# Patient Record
Sex: FEMALE | Race: WHITE | URBAN - METROPOLITAN AREA
[De-identification: names, ages, dates, MRNs, and addresses within clinical notes are randomized per-mention and may not be internally consistent; named-entity substitution may affect disease eponyms.]

---

## 2017-11-02 ENCOUNTER — APPOINTMENT (OUTPATIENT)
Dept: ULTRASOUND IMAGING | Facility: CLINIC | Age: 27
End: 2017-11-02
Attending: EMERGENCY MEDICINE

## 2017-11-02 ENCOUNTER — HOSPITAL ENCOUNTER (EMERGENCY)
Facility: CLINIC | Age: 27
Discharge: HOME | End: 2017-11-02
Attending: EMERGENCY MEDICINE | Admitting: EMERGENCY MEDICINE

## 2017-11-02 VITALS
TEMPERATURE: 98 F | OXYGEN SATURATION: 100 % | WEIGHT: 134.48 LBS | HEART RATE: 80 BPM | SYSTOLIC BLOOD PRESSURE: 117 MMHG | RESPIRATION RATE: 20 BRPM | DIASTOLIC BLOOD PRESSURE: 79 MMHG

## 2017-11-02 DIAGNOSIS — M54.6 ACUTE RIGHT-SIDED THORACIC BACK PAIN: ICD-10-CM

## 2017-11-02 DIAGNOSIS — R10.9 FLANK PAIN: ICD-10-CM

## 2017-11-02 LAB
ALBUMIN SERPL-MCNC: 3.7 G/DL (ref 3.4–5)
ALBUMIN UR-MCNC: NEGATIVE MG/DL
ALP SERPL-CCNC: 61 U/L (ref 40–150)
ALT SERPL W P-5'-P-CCNC: 14 U/L (ref 0–50)
ANION GAP SERPL CALCULATED.3IONS-SCNC: 6 MMOL/L (ref 3–14)
APPEARANCE UR: ABNORMAL
AST SERPL W P-5'-P-CCNC: 7 U/L (ref 0–45)
BACTERIA #/AREA URNS HPF: ABNORMAL /HPF
BASOPHILS # BLD AUTO: 0.1 10E9/L (ref 0–0.2)
BASOPHILS NFR BLD AUTO: 0.8 %
BILIRUB SERPL-MCNC: 0.3 MG/DL (ref 0.2–1.3)
BILIRUB UR QL STRIP: NEGATIVE
BUN SERPL-MCNC: 9 MG/DL (ref 7–30)
CALCIUM SERPL-MCNC: 9 MG/DL (ref 8.5–10.1)
CHLORIDE SERPL-SCNC: 104 MMOL/L (ref 94–109)
CO2 SERPL-SCNC: 28 MMOL/L (ref 20–32)
COLOR UR AUTO: YELLOW
CREAT SERPL-MCNC: 0.62 MG/DL (ref 0.52–1.04)
DIFFERENTIAL METHOD BLD: NORMAL
EOSINOPHIL # BLD AUTO: 0.5 10E9/L (ref 0–0.7)
EOSINOPHIL NFR BLD AUTO: 6.1 %
ERYTHROCYTE [DISTWIDTH] IN BLOOD BY AUTOMATED COUNT: 12.6 % (ref 10–15)
GFR SERPL CREATININE-BSD FRML MDRD: >90 ML/MIN/1.7M2
GLUCOSE SERPL-MCNC: 91 MG/DL (ref 70–99)
GLUCOSE UR STRIP-MCNC: NEGATIVE MG/DL
HCG UR QL: NEGATIVE
HCT VFR BLD AUTO: 38.8 % (ref 35–47)
HGB BLD-MCNC: 12.6 G/DL (ref 11.7–15.7)
HGB UR QL STRIP: NEGATIVE
IMM GRANULOCYTES # BLD: 0 10E9/L (ref 0–0.4)
IMM GRANULOCYTES NFR BLD: 0.4 %
KETONES UR STRIP-MCNC: NEGATIVE MG/DL
LEUKOCYTE ESTERASE UR QL STRIP: NEGATIVE
LIPASE SERPL-CCNC: 109 U/L (ref 73–393)
LYMPHOCYTES # BLD AUTO: 2.7 10E9/L (ref 0.8–5.3)
LYMPHOCYTES NFR BLD AUTO: 33.5 %
MCH RBC QN AUTO: 30.2 PG (ref 26.5–33)
MCHC RBC AUTO-ENTMCNC: 32.5 G/DL (ref 31.5–36.5)
MCV RBC AUTO: 93 FL (ref 78–100)
MONOCYTES # BLD AUTO: 0.5 10E9/L (ref 0–1.3)
MONOCYTES NFR BLD AUTO: 6.8 %
MUCOUS THREADS #/AREA URNS LPF: PRESENT /LPF
NEUTROPHILS # BLD AUTO: 4.2 10E9/L (ref 1.6–8.3)
NEUTROPHILS NFR BLD AUTO: 52.4 %
NITRATE UR QL: NEGATIVE
NRBC # BLD AUTO: 0 10*3/UL
NRBC BLD AUTO-RTO: 0 /100
PH UR STRIP: 5 PH (ref 5–7)
PLATELET # BLD AUTO: 292 10E9/L (ref 150–450)
POTASSIUM SERPL-SCNC: 3.8 MMOL/L (ref 3.4–5.3)
PROT SERPL-MCNC: 7.4 G/DL (ref 6.8–8.8)
RBC # BLD AUTO: 4.17 10E12/L (ref 3.8–5.2)
RBC #/AREA URNS AUTO: 2 /HPF (ref 0–2)
SODIUM SERPL-SCNC: 138 MMOL/L (ref 133–144)
SOURCE: ABNORMAL
SP GR UR STRIP: 1.02 (ref 1–1.03)
SQUAMOUS #/AREA URNS AUTO: 4 /HPF (ref 0–1)
UROBILINOGEN UR STRIP-MCNC: 0 MG/DL (ref 0–2)
WBC # BLD AUTO: 8 10E9/L (ref 4–11)
WBC #/AREA URNS AUTO: 3 /HPF (ref 0–2)

## 2017-11-02 PROCEDURE — 25000128 H RX IP 250 OP 636: Performed by: EMERGENCY MEDICINE

## 2017-11-02 PROCEDURE — 83690 ASSAY OF LIPASE: CPT | Performed by: EMERGENCY MEDICINE

## 2017-11-02 PROCEDURE — 80053 COMPREHEN METABOLIC PANEL: CPT | Performed by: EMERGENCY MEDICINE

## 2017-11-02 PROCEDURE — 96374 THER/PROPH/DIAG INJ IV PUSH: CPT

## 2017-11-02 PROCEDURE — 81001 URINALYSIS AUTO W/SCOPE: CPT | Performed by: EMERGENCY MEDICINE

## 2017-11-02 PROCEDURE — 99285 EMERGENCY DEPT VISIT HI MDM: CPT | Mod: 25

## 2017-11-02 PROCEDURE — 76705 ECHO EXAM OF ABDOMEN: CPT

## 2017-11-02 PROCEDURE — 96375 TX/PRO/DX INJ NEW DRUG ADDON: CPT

## 2017-11-02 PROCEDURE — 96361 HYDRATE IV INFUSION ADD-ON: CPT

## 2017-11-02 PROCEDURE — 81025 URINE PREGNANCY TEST: CPT | Performed by: EMERGENCY MEDICINE

## 2017-11-02 PROCEDURE — 85025 COMPLETE CBC W/AUTO DIFF WBC: CPT | Performed by: EMERGENCY MEDICINE

## 2017-11-02 RX ORDER — KETOROLAC TROMETHAMINE 30 MG/ML
30 INJECTION, SOLUTION INTRAMUSCULAR; INTRAVENOUS ONCE
Status: COMPLETED | OUTPATIENT
Start: 2017-11-02 | End: 2017-11-02

## 2017-11-02 RX ORDER — DIAZEPAM 10 MG/2ML
5 INJECTION, SOLUTION INTRAMUSCULAR; INTRAVENOUS ONCE
Status: COMPLETED | OUTPATIENT
Start: 2017-11-02 | End: 2017-11-02

## 2017-11-02 RX ORDER — SODIUM CHLORIDE 9 MG/ML
1000 INJECTION, SOLUTION INTRAVENOUS CONTINUOUS
Status: DISCONTINUED | OUTPATIENT
Start: 2017-11-02 | End: 2017-11-02 | Stop reason: HOSPADM

## 2017-11-02 RX ORDER — DIAZEPAM 5 MG
5 TABLET ORAL EVERY 6 HOURS PRN
Qty: 12 TABLET | Refills: 0 | Status: SHIPPED | OUTPATIENT
Start: 2017-11-02 | End: 2018-06-16

## 2017-11-02 RX ADMIN — SODIUM CHLORIDE 1000 ML: 9 INJECTION, SOLUTION INTRAVENOUS at 17:33

## 2017-11-02 RX ADMIN — KETOROLAC TROMETHAMINE 30 MG: 30 INJECTION, SOLUTION INTRAMUSCULAR at 17:33

## 2017-11-02 RX ADMIN — DIAZEPAM 5 MG: 5 INJECTION, SOLUTION INTRAMUSCULAR; INTRAVENOUS at 18:17

## 2017-11-02 ASSESSMENT — ENCOUNTER SYMPTOMS
SHORTNESS OF BREATH: 0
APPETITE CHANGE: 1
FEVER: 0
DYSURIA: 0
BACK PAIN: 1
FLANK PAIN: 1
FREQUENCY: 1

## 2017-11-02 NOTE — ED PROVIDER NOTES
History     Chief Complaint:  Abdominal Pain    HPI   Alla Mcclain is a 26 year old female who presents to the emergency department for evaluation of back and abdominal pain. The patient says that she has been having a pain deep in her body that she feels in her back and in between her ribs for the past 3 days. She says in the past when she had it, it just goes away on its own after a couple of days. She describes this pain as a dull aching pain or like a pressure inside with a random stabbing pain. She says that movement doesn't affect it too much and she hasn't eaten today due to her pain. She also notes an increase in frequency and notes that she feels pain with deep breaths. She denies any fevers, dysuria, chest pain or shortness of breath. And she denies any recent injuries that could be the cause.     Allergies:  No known Drug Allergies      Medications:    Sertraline   Hydroxyzine  Ibuprofen     Past Medical History:    Anxiety  Depression    Past Surgical History:    C section    Family History:    History reviewed. No pertinent family history.     Social History:  History of gallbladder removal in immediate family     Review of Systems   Constitutional: Positive for appetite change. Negative for fever.   Respiratory: Negative for shortness of breath.    Cardiovascular: Negative for chest pain.   Genitourinary: Positive for flank pain and frequency. Negative for dysuria.   Musculoskeletal: Positive for back pain.   All other systems reviewed and are negative.        Physical Exam   First Vitals:  BP: 106/83  Pulse: 79  Temp: 98  F (36.7  C)  Resp: 16  Weight: 61 kg (134 lb 7.7 oz)  SpO2: 99 %      Physical Exam  Constitutional: Alert, attentive, GCS 15, well appearing young woman   HENT:    Nose: Nose normal.    Mouth/Throat: Oropharynx is clear, mucous membranes are moist   Eyes: Normal conjunctiva. Pupils are equal, round, and reactive to light.   CV: regular rate and rhythm; no murmurs, rubs or  gallups  Chest: Effort normal and breath sounds normal.   GI:  There is tenderness to palpation in RUQ and epigastrium, no rebound or guarding. No distension. Normal bowel sounds  Back: she has tenderness to palpation in her right thoracic paraspinal muscle and flank, no midline C/T/L spine tenderness  MSK: Normal range of motion.   Neurological: Alert, attentive, oriented x4, strength intact  Skin: Skin is warm and dry.     Emergency Department Course     Imaging:  Radiology findings were communicated with the patient who voiced understanding of the findings.      Abdomen US, limited (RUQ only)  Preliminary Result  IMPRESSION: No gallstones or biliary dilatation. No evidence of right  Hydronephrosis.  Reading is prelaminarly at time of note    Laboratory:  Laboratory findings were communicated with the patient who voiced understanding of the findings.    Lipase: 109  Ua with microscopic: few bacteria (A), squamous epithelial 4 (H), WBC/HPF 3 (H), mucous present (A)  CBC: WBC 8, HGB 12.6,   CMP: o/w WNL (Creatinine 0.62)  HCG: Negative    Interventions:  1733 NS 1L IV Bolus   1733 Toradol 30 mg IV  1817 Valium 5 mg IV    Emergency Department Course:  Nursing notes and vitals reviewed.  I performed an exam of the patient as documented above.   I discussed the treatment plan with the patient. They expressed understanding of this plan and consented to discharge. They will be discharged home with instructions for care and follow up. In addition, the patient will return to the emergency department if their symptoms persist, worsen, if new symptoms arise or if there is any concern.  All questions were answered.     I personally reviewed the imaging and lab results with the patient and answered all related questions prior to discharge.  Impression & Plan      Medical Decision Makin year old female presenting with right thoracic back, flank, and upper abdominal pain.  Differential diagnosis includes renal  colic, cholelithiasis, cholecystitis, GERD, gastritis, thoracic back pain, muscle spasm, pulmonary embolism, pancreatitis.  She did have both thoracic paraspinal muscle tenderness and upper abdominal pain on exam.  Her work-up in the ED has been negative including screening abdominal labs and UA.  There is no hematuria to suggest renal colic and her pain does not appear severe enough for renal colic.  As she had been told she may have gallstones in the past with previous episodes of pain and because she doesn't have established primary care, I did obtain a RUQ ultrasound which shows a normal gallbladder.  Her pain appeared to be most improved with valium.  Repeat exam shows the majority of the pain is in her right thoracic back.  I discussed that muscle sprain is most likely diagnosis, but that she would need to see primary care doctor for follow-up.  Patient was amenable to this plan and is comfortable for discharge home.  All questions were answered.      Diagnosis:    ICD-10-CM    1. Flank pain R10.9 CBC with platelets differential     Comprehensive metabolic panel     UA with Microscopic     HCG qualitative urine     Lipase   2. Acute right-sided thoracic back pain M54.6      Disposition:   Discharged     Discharge Medications:  New Prescriptions    DIAZEPAM (VALIUM) 5 MG TABLET    Take 1 tablet (5 mg) by mouth every 6 hours as needed for muscle spasms (MUSCLE SPASM)       Scribe Disclosure:  I, Ata Hinojosa, am serving as a scribe at 5:56 PM on 11/2/2017 to document services personally performed by Carlee Topete MD, based on my observations and the provider's statements to me.     [facility] EMERGENCY DEPARTMENT       Carlee Topete MD  11/03/17 031

## 2017-11-02 NOTE — ED AVS SNAPSHOT
[facility] Emergency Department    [address] [facility] [address]    Phone:  [phone]    Fax:  [phone]                                       Alla Mcclain   MRN: [id number]    Department:  [facility] Emergency Department   Date of Visit:  11/2/2017           Patient Information     Date Of Birth          1990        Your diagnoses for this visit were:     Flank pain     Acute right-sided thoracic back pain        You were seen by Carlee Topete MD.      Follow-up Information     Follow up with [facility]. Schedule an appointment as soon as possible for a visit in 1 week.    Specialty:  Family Medicine    Why:  for follow-up    Contact information:    [address] [facility] [address] [phone]        Go to [facility] Emergency Department.    Specialty:  EMERGENCY MEDICINE    Why:  If symptoms worsen including fevers, vomiting, severe pain    Contact information:    [address] [facility] [address] [phone]        Discharge Instructions       Discharge Instructions  Back Pain  You were seen today for back pain. Back pain can have many causes, but most will get better without surgery or other specific treatment. Sometimes there is a herniated ( slipped ) disc. We do not usually do MRI scans to look for these right away, since most herniated discs will get better on their own with time.  Today, we did not find any evidence that your back pain was caused by a serious condition. However, sometimes symptoms develop over time and cannot be found during an emergency visit, so it is very important that you follow up with your primary provider.  Generally, every Emergency Department visit should have a follow-up clinic visit with either a primary or a specialty clinic/provider. Please follow-up as instructed by your emergency provider today.    Return to the Emergency  Department if:    You develop a fever with your back pain.     You have weakness or change in sensation in one or both legs.    You lose control of your bowels or bladder, or cannot empty your bladder (cannot pee).    Your pain gets much worse.     Follow-up with your provider:    Unless your pain has completely gone away, please make an appointment with your provider within one week. Most of the routine care for back pain is available in a clinic and not the Emergency Department. You may need further management of your back pain, such as more pain medication, imaging such as an X-ray or MRI, or physical therapy.    What can I do to help myself?    Remain Active -- People are often afraid that they will hurt their back further or delay recovery by remaining active, but this is one of the best things you can do for your back. In fact, staying in bed for a long time to rest is not recommended. Studies have shown that people with low back pain recover faster when they remain active. Movement helps to bring blood flow to the muscles and relieve muscle spasms as well as preventing loss of muscle strength.    Heat -- Using a heating pad can help with low back pain during the first few weeks. Do not sleep with a heating pad, as you can be burned.     Pain medications - You may take a pain medication such as Tylenol  (acetaminophen), Advil , Motrin  (ibuprofen) or Aleve  (naproxen).  If you were given a prescription for medicine here today, be sure to read all of the information (including the package insert) that comes with your prescription.  This will include important information about the medicine, its side effects, and any warnings that you need to know about.  The pharmacist who fills the prescription can provide more information and answer questions you may have about the medicine.  If you have questions or concerns that the pharmacist cannot address, please call or return to the Emergency Department.   Remember  that you can always come back to the Emergency Department if you are not able to see your regular provider in the amount of time listed above, if you get any new symptoms, or if there is anything that worries you.    Flank Pain, Uncertain Cause  The flank is the area between your upper abdomen and your back. Pain there is often caused by a problem with your kidneys. It might be a kidney infection or a kidney stone. Other causes of flank pain include spinal arthritis, a pinched nerve from a back injury, or a back muscle strain or spasm.  The cause of your flank pain is not certain. You may need other tests.  Home care  Follow these tips when caring for yourself at home:    You may use acetaminophen or ibuprofen to control pain, unless your health care provider prescribed another medicine. If you have chronic liver or kidney disease, talk with your provider before taking these medicines. Also talk with your provider first if you ve ever had a stomach ulcer or GI bleeding.    If the pain is coming from your muscles, you may get relief with ice or heat. During the first 2 days after the injury, put an ice pack on the painful area for 20 minutes every 2 to 4 hours. This will reduce swelling and pain. A hot shower, hot bath, or heating pad works well for a muscle spasm. You can start with ice, then switch to heat after 2 days. You might find that alternating ice and heat works well. Use the method that feels the best to you.  Follow-up care  Follow up with your healthcare provider if your symptoms don t get better over the next few days.  When to seek medical advice  Call your healthcare provider right away if any of these happen:    Repeated vomiting    Fever of 100.4 F (38 C) or higher, or as directed by your health care provider    Flank pain that gets worse    Pain that spreads to the front of your belly (abdomen)    Dizziness, weakness, or fainting    Blood in your urine    Burning feeling when you urinate or the  need to urinate often    Pain in one of your legs that gets worse    Numbness or weakness in a leg  Date Last Reviewed: 10/1/2016    7587-5640 [employer]. [address] [place] [address], [place] [address]. All rights reserved. This information is not intended as a substitute for professional medical care. Always follow your healthcare professional's instructions.          24 Hour Appointment Hotline       To make an appointment at any [facility], call [phone] ([phone]). If you don't have a family doctor or clinic, we will help you find one. [facility] clinics are conveniently located to serve the needs of you and your family.             Review of your medicines      START taking        Dose / Directions Last dose taken    diazepam 5 MG tablet   Commonly known as:  VALIUM   Dose:  5 mg   Quantity:  12 tablet        Take 1 tablet (5 mg) by mouth every 6 hours as needed for muscle spasms (MUSCLE SPASM)   Refills:  0          Our records show that you are taking the medicines listed below. If these are incorrect, please call your family doctor or clinic.        Dose / Directions Last dose taken    HYDROXYZINE HCL PO        Refills:  0        IBUPROFEN PO        Refills:  0        ZOLOFT PO        Take by mouth daily   Refills:  0                Prescriptions were sent or printed at these locations (1 Prescription)                   Other Prescriptions                Printed at Department/Unit printer (1 of 1)         diazepam (VALIUM) 5 MG tablet                Procedures and tests performed during your visit     Abdomen US, limited (RUQ only)    CBC with platelets differential    Comprehensive metabolic panel    HCG qualitative urine    Lipase    UA with Microscopic      Orders Needing Specimen Collection     None      Pending Results     Date and Time Order Name Status Description    11/2/2017 1803 Abdomen US, limited (RUQ only) Preliminary             Pending Culture Results     No orders  found from 10/31/2017 to 11/3/2017.            Pending Results Instructions     If you had any lab results that were not finalized at the time of your Discharge, you can call the ED Lab Result RN at [phone]. You will be contacted by this team for any positive Lab results or changes in treatment. The nurses are available 7 days a week from 10A to 6:30P.  You can leave a message 24 hours per day and they will return your call.        Test Results From Your Hospital Stay        11/2/2017  5:42 PM      Component Results     Component Value Ref Range & Units Status    WBC 8.0 4.0 - 11.0 10e9/L Final    RBC Count 4.17 3.8 - 5.2 10e12/L Final    Hemoglobin 12.6 11.7 - 15.7 g/dL Final    Hematocrit 38.8 35.0 - 47.0 % Final    MCV 93 78 - 100 fl Final    MCH 30.2 26.5 - 33.0 pg Final    MCHC 32.5 31.5 - 36.5 g/dL Final    RDW 12.6 10.0 - 15.0 % Final    Platelet Count 292 150 - 450 10e9/L Final    Diff Method Automated Method  Final    % Neutrophils 52.4 % Final    % Lymphocytes 33.5 % Final    % Monocytes 6.8 % Final    % Eosinophils 6.1 % Final    % Basophils 0.8 % Final    % Immature Granulocytes 0.4 % Final    Nucleated RBCs 0 0 /100 Final    Absolute Neutrophil 4.2 1.6 - 8.3 10e9/L Final    Absolute Lymphocytes 2.7 0.8 - 5.3 10e9/L Final    Absolute Monocytes 0.5 0.0 - 1.3 10e9/L Final    Absolute Eosinophils 0.5 0.0 - 0.7 10e9/L Final    Absolute Basophils 0.1 0.0 - 0.2 10e9/L Final    Abs Immature Granulocytes 0.0 0 - 0.4 10e9/L Final    Absolute Nucleated RBC 0.0  Final         11/2/2017  5:59 PM      Component Results     Component Value Ref Range & Units Status    Sodium 138 133 - 144 mmol/L Final    Potassium 3.8 3.4 - 5.3 mmol/L Final    Chloride 104 94 - 109 mmol/L Final    Carbon Dioxide 28 20 - 32 mmol/L Final    Anion Gap 6 3 - 14 mmol/L Final    Glucose 91 70 - 99 mg/dL Final    Urea Nitrogen 9 7 - 30 mg/dL Final    Creatinine 0.62 0.52 - 1.04 mg/dL Final    GFR Estimate >90 >60 mL/min/1.7m2 Final     Non  GFR Calc    GFR Estimate If Black >90 >60 mL/min/1.7m2 Final    African American GFR Calc    Calcium 9.0 8.5 - 10.1 mg/dL Final    Bilirubin Total 0.3 0.2 - 1.3 mg/dL Final    Albumin 3.7 3.4 - 5.0 g/dL Final    Protein Total 7.4 6.8 - 8.8 g/dL Final    Alkaline Phosphatase 61 40 - 150 U/L Final    ALT 14 0 - 50 U/L Final    AST 7 0 - 45 U/L Final         11/2/2017  5:49 PM      Component Results     Component Value Ref Range & Units Status    Color Urine Yellow  Final    Appearance Urine Slightly Cloudy  Final    Glucose Urine Negative NEG^Negative mg/dL Final    Bilirubin Urine Negative NEG^Negative Final    Ketones Urine Negative NEG^Negative mg/dL Final    Specific Gravity Urine 1.016 1.003 - 1.035 Final    Blood Urine Negative NEG^Negative Final    pH Urine 5.0 5.0 - 7.0 pH Final    Protein Albumin Urine Negative NEG^Negative mg/dL Final    Urobilinogen mg/dL 0.0 0.0 - 2.0 mg/dL Final    Nitrite Urine Negative NEG^Negative Final    Leukocyte Esterase Urine Negative NEG^Negative Final    Source Midstream Urine  Final    WBC Urine 3 (H) 0 - 2 /HPF Final    RBC Urine 2 0 - 2 /HPF Final    Bacteria Urine Few (A) NEG^Negative /HPF Final    Squamous Epithelial /HPF Urine 4 (H) 0 - 1 /HPF Final    Mucous Urine Present (A) NEG^Negative /LPF Final         11/2/2017  5:49 PM      Component Results     Component Value Ref Range & Units Status    HCG Qual Urine Negative NEG^Negative Final    This test is for screening purposes.  Results should be interpreted along with   the clinical picture.  Confirmation testing is available if warranted by   ordering [id number], HCG Quantitative Pregnancy.           11/2/2017  5:59 PM      Component Results     Component Value Ref Range & Units Status    Lipase 109 73 - 393 U/L Final         11/2/2017  7:03 PM      Narrative     US ABDOMEN LIMITED  11/2/2017 6:46 PM     HISTORY: RUQ pain/right flank pain, multiple episodes.    COMPARISON: None.    FINDINGS: Gallbladder  is normal. No gallstones or gallbladder wall  thickening. Common duct is normal at 0.3 cm. The liver has a normal  appearance. Visualized portions of the pancreas are unremarkable.  Right kidney is normal. Aorta is normal.        Impression     IMPRESSION: No gallstones or biliary dilatation. No evidence of right  hydronephrosis.                 Clinical Quality Measure: Blood Pressure Screening     Your blood pressure was checked while you were in the emergency department today. The last reading we obtained was  BP: 121/88 . Please read the guidelines below about what these numbers mean and what you should do about them.  If your systolic blood pressure (the top number) is less than 120 and your diastolic blood pressure (the bottom number) is less than 80, then your blood pressure is normal. There is nothing more that you need to do about it.  If your systolic blood pressure (the top number) is 120-139 or your diastolic blood pressure (the bottom number) is 80-89, your blood pressure may be higher than it should be. You should have your blood pressure rechecked within a year by a primary care provider.  If your systolic blood pressure (the top number) is 140 or greater or your diastolic blood pressure (the bottom number) is 90 or greater, you may have high blood pressure. High blood pressure is treatable, but if left untreated over time it can put you at risk for heart attack, stroke, or kidney failure. You should have your blood pressure rechecked by a primary care provider within the next 4 weeks.  If your provider in the emergency department today gave you specific instructions to follow-up with your doctor or provider even sooner than that, you should follow that instruction and not wait for up to 4 weeks for your follow-up visit.        Thank you for choosing [facility]       Thank you for choosing [facility] for your care. Our goal is always to provide you with excellent care. Hearing back from our patients is  "one way we can continue to improve our services. Please take a few minutes to complete the written survey that you may receive in the mail after you visit with us. Thank you!        [facility] Information     [facility] lets you send messages to your doctor, view your test results, renew your prescriptions, schedule appointments and more. To sign up, go to [email or web address] . Click on \"Log in\" on the left side of the screen, which will take you to the Welcome page. Then click on \"Sign up Now\" on the right side of the page.     You will be asked to enter the access code listed below, as well as some personal information. Please follow the directions to create your username and password.     Your access code is: [email or web address]  Expires: 2018  7:19 PM     Your access code will  in 90 days. If you need help or a new code, please call your [facility] clinic or [phone].        Care EveryWhere ID     This is your Care EveryWhere ID. This could be used by other organizations to access your [facility] medical records  [id number]        Equal Access to Services     JOHN DALTON : Hadphilip Ruiz, gabriella jordan, kiki carter, kaur boone. So [facility] [phone].    ATENCIÓN: Si habla español, tiene a gee disposición servicios gratuitos de asistencia lingüística. Llame al [phone].    We comply with applicable federal civil rights laws and [place] laws. We do not discriminate on the basis of race, color, national origin, age, disability, sex, sexual orientation, or gender identity.            After Visit Summary       This is your record. Keep this with you and show to your community pharmacist(s) and doctor(s) at your next visit.                  "

## 2017-11-02 NOTE — ED NOTES
Here with a 3 day histrty of right flank pain . No dysuria No frequency . Pain constantly but sharp at times Food and fluids poor

## 2017-11-02 NOTE — ED AVS SNAPSHOT
[facility] Emergency Department    [address]    [facility] [address]    Phone:  [phone]    Fax:  [phone]                                       Alla Mcclain   MRN: [id number]    Department:  [facility] Emergency Department   Date of Visit:  11/2/2017           After Visit Summary Signature Page     I have received my discharge instructions, and my questions have been answered. I have discussed any challenges I see with this plan with the nurse or doctor.    ..........................................................................................................................................  Patient/Patient Representative Signature      ..........................................................................................................................................  Patient Representative Print Name and Relationship to Patient    ..................................................               ................................................  Date                                            Time    ..........................................................................................................................................  Reviewed by Signature/Title    ...................................................              ..............................................  Date                                                            Time

## 2017-11-03 NOTE — DISCHARGE INSTRUCTIONS
Discharge Instructions  Back Pain  You were seen today for back pain. Back pain can have many causes, but most will get better without surgery or other specific treatment. Sometimes there is a herniated ( slipped ) disc. We do not usually do MRI scans to look for these right away, since most herniated discs will get better on their own with time.  Today, we did not find any evidence that your back pain was caused by a serious condition. However, sometimes symptoms develop over time and cannot be found during an emergency visit, so it is very important that you follow up with your primary provider.  Generally, every Emergency Department visit should have a follow-up clinic visit with either a primary or a specialty clinic/provider. Please follow-up as instructed by your emergency provider today.    Return to the Emergency Department if:    You develop a fever with your back pain.     You have weakness or change in sensation in one or both legs.    You lose control of your bowels or bladder, or cannot empty your bladder (cannot pee).    Your pain gets much worse.     Follow-up with your provider:    Unless your pain has completely gone away, please make an appointment with your provider within one week. Most of the routine care for back pain is available in a clinic and not the Emergency Department. You may need further management of your back pain, such as more pain medication, imaging such as an X-ray or MRI, or physical therapy.    What can I do to help myself?    Remain Active -- People are often afraid that they will hurt their back further or delay recovery by remaining active, but this is one of the best things you can do for your back. In fact, staying in bed for a long time to rest is not recommended. Studies have shown that people with low back pain recover faster when they remain active. Movement helps to bring blood flow to the muscles and relieve muscle spasms as well as preventing loss of muscle  strength.    Heat -- Using a heating pad can help with low back pain during the first few weeks. Do not sleep with a heating pad, as you can be burned.     Pain medications - You may take a pain medication such as Tylenol  (acetaminophen), Advil , Motrin  (ibuprofen) or Aleve  (naproxen).  If you were given a prescription for medicine here today, be sure to read all of the information (including the package insert) that comes with your prescription.  This will include important information about the medicine, its side effects, and any warnings that you need to know about.  The pharmacist who fills the prescription can provide more information and answer questions you may have about the medicine.  If you have questions or concerns that the pharmacist cannot address, please call or return to the Emergency Department.   Remember that you can always come back to the Emergency Department if you are not able to see your regular provider in the amount of time listed above, if you get any new symptoms, or if there is anything that worries you.    Flank Pain, Uncertain Cause  The flank is the area between your upper abdomen and your back. Pain there is often caused by a problem with your kidneys. It might be a kidney infection or a kidney stone. Other causes of flank pain include spinal arthritis, a pinched nerve from a back injury, or a back muscle strain or spasm.  The cause of your flank pain is not certain. You may need other tests.  Home care  Follow these tips when caring for yourself at home:    You may use acetaminophen or ibuprofen to control pain, unless your health care provider prescribed another medicine. If you have chronic liver or kidney disease, talk with your provider before taking these medicines. Also talk with your provider first if you ve ever had a stomach ulcer or GI bleeding.    If the pain is coming from your muscles, you may get relief with ice or heat. During the first 2 days after the injury, put  an ice pack on the painful area for 20 minutes every 2 to 4 hours. This will reduce swelling and pain. A hot shower, hot bath, or heating pad works well for a muscle spasm. You can start with ice, then switch to heat after 2 days. You might find that alternating ice and heat works well. Use the method that feels the best to you.  Follow-up care  Follow up with your healthcare provider if your symptoms don t get better over the next few days.  When to seek medical advice  Call your healthcare provider right away if any of these happen:    Repeated vomiting    Fever of 100.4 F (38 C) or higher, or as directed by your health care provider    Flank pain that gets worse    Pain that spreads to the front of your belly (abdomen)    Dizziness, weakness, or fainting    Blood in your urine    Burning feeling when you urinate or the need to urinate often    Pain in one of your legs that gets worse    Numbness or weakness in a leg  Date Last Reviewed: 10/1/2016    9664-3603 [employer]. [address] [place] [address], [place] [address]. All rights reserved. This information is not intended as a substitute for professional medical care. Always follow your healthcare professional's instructions.

## 2017-11-09 ENCOUNTER — OFFICE VISIT (OUTPATIENT)
Dept: INTERNAL MEDICINE | Facility: CLINIC | Age: 27
End: 2017-11-09

## 2017-11-09 VITALS
WEIGHT: 135.1 LBS | BODY MASS INDEX: 24.86 KG/M2 | HEART RATE: 84 BPM | SYSTOLIC BLOOD PRESSURE: 90 MMHG | RESPIRATION RATE: 16 BRPM | TEMPERATURE: 98.4 F | DIASTOLIC BLOOD PRESSURE: 60 MMHG | OXYGEN SATURATION: 98 % | HEIGHT: 62 IN

## 2017-11-09 DIAGNOSIS — F41.9 ANXIETY: ICD-10-CM

## 2017-11-09 DIAGNOSIS — M62.838 MUSCLE SPASM: Primary | ICD-10-CM

## 2017-11-09 DIAGNOSIS — F33.1 MODERATE EPISODE OF RECURRENT MAJOR DEPRESSIVE DISORDER (H): ICD-10-CM

## 2017-11-09 PROCEDURE — 99203 OFFICE O/P NEW LOW 30 MIN: CPT | Performed by: NURSE PRACTITIONER

## 2017-11-09 RX ORDER — CYCLOBENZAPRINE HCL 10 MG
5-10 TABLET ORAL 3 TIMES DAILY PRN
Qty: 30 TABLET | Refills: 1 | Status: SHIPPED | OUTPATIENT
Start: 2017-11-09 | End: 2018-06-16

## 2017-11-09 ASSESSMENT — ANXIETY QUESTIONNAIRES
IF YOU CHECKED OFF ANY PROBLEMS ON THIS QUESTIONNAIRE, HOW DIFFICULT HAVE THESE PROBLEMS MADE IT FOR YOU TO DO YOUR WORK, TAKE CARE OF THINGS AT HOME, OR GET ALONG WITH OTHER PEOPLE: VERY DIFFICULT
1. FEELING NERVOUS, ANXIOUS, OR ON EDGE: NEARLY EVERY DAY
2. NOT BEING ABLE TO STOP OR CONTROL WORRYING: NEARLY EVERY DAY
GAD7 TOTAL SCORE: 18
5. BEING SO RESTLESS THAT IT IS HARD TO SIT STILL: NOT AT ALL
6. BECOMING EASILY ANNOYED OR IRRITABLE: NEARLY EVERY DAY
7. FEELING AFRAID AS IF SOMETHING AWFUL MIGHT HAPPEN: NEARLY EVERY DAY
3. WORRYING TOO MUCH ABOUT DIFFERENT THINGS: NEARLY EVERY DAY

## 2017-11-09 ASSESSMENT — PATIENT HEALTH QUESTIONNAIRE - PHQ9
5. POOR APPETITE OR OVEREATING: NEARLY EVERY DAY
SUM OF ALL RESPONSES TO PHQ QUESTIONS 1-9: 22

## 2017-11-09 NOTE — PROGRESS NOTES
"  SUBJECTIVE:   Alla Mcclain is a 26 year old female who presents to clinic today for the following health issues:      ED/UC Followup:    Facility:  [facility]  Date of visit: 17  Reason for visit: abdominal pain  Current Status: still having pain- not any better.  Describes as a \"squishy ball\" R flank.  ER w/u negative including US.  has had in the past, resolved on own.  Increased anxiety, depression, goes to acute psych clinic [facility], will have insurance in a few weeks, needs to find new therapist.          Patient Active Problem List   Diagnosis     Moderate episode of recurrent major depressive disorder (H)     Anxiety     Past Surgical History:   Procedure Laterality Date     APPENDECTOMY            SECTION         Social History   Substance Use Topics     Smoking status: Current Every Day Smoker     Types: Cigarettes     Smokeless tobacco: Never Used     Alcohol use No     Family History   Problem Relation Age of Onset     Depression Mother      MENTAL ILLNESS Father      Asthma Son      Heart Murmur Brother      Asthma Brother          Current Outpatient Prescriptions   Medication Sig Dispense Refill     VITAMIN D, CHOLECALCIFEROL, PO Take by mouth daily       cyclobenzaprine (FLEXERIL) 10 MG tablet Take 0.5-1 tablets (5-10 mg) by mouth 3 times daily as needed for muscle spasms 30 tablet 1     Sertraline HCl (ZOLOFT PO) Take by mouth daily       HYDROXYZINE HCL PO        IBUPROFEN PO        diazepam (VALIUM) 5 MG tablet Take 1 tablet (5 mg) by mouth every 6 hours as needed for muscle spasms (MUSCLE SPASM) 12 tablet 0     BP Readings from Last 3 Encounters:   17 90/60   17 117/79    Wt Readings from Last 3 Encounters:   17 135 lb 1.6 oz (61.3 kg)   17 134 lb 7.7 oz (61 kg)                        Reviewed and updated as needed this visit by clinical staffTobacco  Allergies  Med Hx  Surg Hx  Fam Hx  Soc Hx      Reviewed and updated as needed this visit by " "Provider         ROS:  C: NEGATIVE for fever, chills, change in weight  E/M: NEGATIVE for ear, mouth and throat problems  R: NEGATIVE for significant cough or SOB  CV: NEGATIVE for chest pain, palpitations or peripheral edema  GI: POSITIVE for constipation and diarrhea    OBJECTIVE:     BP 90/60 (BP Location: Right arm, Patient Position: Sitting, Cuff Size: Adult Large)  Pulse 84  Temp 98.4  F (36.9  C) (Oral)  Resp 16  Ht 5' 2\" (1.575 m)  Wt 135 lb 1.6 oz (61.3 kg)  LMP 10/05/2017 (Exact Date)  SpO2 98%  BMI 24.71 kg/m2  Body mass index is 24.71 kg/(m^2).  GENERAL: healthy, alert and no distress  ABDOMEN: soft, nontender, no hepatosplenomegaly, no masses and bowel sounds normal  BACK: no CVA tenderness, no paralumbar tenderness  PSYCH: mentation appears normal and affect flat        ASSESSMENT/PLAN:               ICD-10-CM    1. Muscle spasm M62.838 cyclobenzaprine (FLEXERIL) 10 MG tablet   2. Moderate episode of recurrent major depressive disorder (H) F33.1 MENTAL HEALTH REFERRAL   3. Anxiety F41.9 MENTAL HEALTH REFERRAL       F/u if not improving    Gracie Carrillo NP  [facility]    "

## 2017-11-09 NOTE — NURSING NOTE
"Chief Complaint   Patient presents with     Follow Up For     [facility] ER on 11/02/17 abdominal pain       Initial BP 90/60 (BP Location: Right arm, Patient Position: Sitting, Cuff Size: Adult Large)  Pulse 84  Temp 98.4  F (36.9  C) (Oral)  Resp 16  Ht 5' 2\" (1.575 m)  Wt 135 lb 1.6 oz (61.3 kg)  LMP 10/05/2017 (Exact Date)  SpO2 98%  BMI 24.71 kg/m2 Estimated body mass index is 24.71 kg/(m^2) as calculated from the following:    Height as of this encounter: 5' 2\" (1.575 m).    Weight as of this encounter: 135 lb 1.6 oz (61.3 kg).  Medication Reconciliation: complete    "

## 2017-11-09 NOTE — MR AVS SNAPSHOT
After Visit Summary   11/9/2017    Alla Mcclain    MRN: [id number]           Patient Information     Date Of Birth          1990        Visit Information        Provider Department      11/9/2017 12:40 PM Gracie Carrillo NP [facility]        Today's Diagnoses     Muscle spasm    -  1    Moderate episode of recurrent major depressive disorder (H)        Anxiety           Follow-ups after your visit        Additional Services     MENTAL HEALTH REFERRAL       Your provider has referred you to: [facility]: [facility] - Counseling (Individual/Couples/Family) - [facility] [phone]   [email or web address]/   *Patient will be contacted by [facility] scheduling partner, [facility]), to schedule an appointment.  Patients may also call [facility] to schedule.    All scheduling is subject to the client's specific insurance plan & benefits, provider/location availability, and provider clinical specialities.  Please arrive 15 minutes early for your first appointment and bring your completed paperwork.    Please be aware that coverage of these services is subject to the terms and limitations of your health insurance plan.  Call member services at your health plan with any benefit or coverage questions.                  Who to contact     If you have questions or need follow up information about today's clinic visit or your schedule please contact [facility] directly at [phone].  Normal or non-critical lab and imaging results will be communicated to you by MyChart, letter or phone within 4 business days after the clinic has received the results. If you do not hear from us within 7 days, please contact the clinic through MyChart or phone. If you have a critical or abnormal lab result, we will notify you by phone as soon as possible.  Submit refill requests  "through [employer] or call your pharmacy and they will forward the refill request to us. Please allow 3 business days for your refill to be completed.          Additional Information About Your Visit        [employer] Information     [employer] lets you send messages to your doctor, view your test results, renew your prescriptions, schedule appointments and more. To sign up, go to [email or web address] . Click on \"Log in\" on the left side of the screen, which will take you to the Welcome page. Then click on \"Sign up Now\" on the right side of the page.     You will be asked to enter the access code listed below, as well as some personal information. Please follow the directions to create your username and password.     Your access code is: [email or web address]  Expires: 2018  6:19 PM     Your access code will  in 90 days. If you need help or a new code, please call your [facility] clinic or [phone].        Care EveryWhere ID     This is your Care EveryWhere ID. This could be used by other organizations to access your [facility] medical records  [id number]        Your Vitals Were     Pulse Temperature Respirations Height Last Period Pulse Oximetry    84 98.4  F (36.9  C) (Oral) 16 5' 2\" (1.575 m) 10/05/2017 (Exact Date) 98%    BMI (Body Mass Index)                   24.71 kg/m2            Blood Pressure from Last 3 Encounters:   17 90/60   17 117/79    Weight from Last 3 Encounters:   17 135 lb 1.6 oz (61.3 kg)   17 134 lb 7.7 oz (61 kg)              We Performed the Following     MENTAL HEALTH REFERRAL          Today's Medication Changes          These changes are accurate as of: 17  1:22 PM.  If you have any questions, ask your nurse or doctor.               Start taking these medicines.        Dose/Directions    cyclobenzaprine 10 MG tablet   Commonly known as:  FLEXERIL   Used for:  Muscle spasm   Started by:  Gracie Carrillo NP        Dose:  5-10 mg   Take 0.5-1 tablets (5-10 mg) " by mouth 3 times daily as needed for muscle spasms   Quantity:  30 tablet   Refills:  1            Where to get your medicines      These medications were sent to [facility] [address] [place] [address] [address] [place] [address]     Phone:  [phone]     cyclobenzaprine 10 MG tablet                Primary Care Provider    Physician No Ref-Primary       NO REF-PRIMARY PHYSICIAN        Equal Access to Services     JOHN DALTON : Hadii aad ku hadasho Soomaali, waaxda luqadaha, qaybta kaalmada adeegyada, waxay idiin hayaan adeeg khrosanash laMargaritajeanninen ah. So [facility] [phone].    ATENCIÓN: Si habla espneo, tiene a gee disposición servicios gratuitos de asistencia lingüística. [place] al [phone].    We comply with applicable federal civil rights laws and [place] laws. We do not discriminate on the basis of race, color, national origin, age, disability, sex, sexual orientation, or gender identity.            Thank you!     Thank you for choosing [facility]  for your care. Our goal is always to provide you with excellent care. Hearing back from our patients is one way we can continue to improve our services. Please take a few minutes to complete the written survey that you may receive in the mail after your visit with us. Thank you!             Your Updated Medication List - Protect others around you: Learn how to safely use, store and throw away your medicines at www.disposemymeds.org.          This list is accurate as of: 11/9/17  1:22 PM.  Always use your most recent med list.                   Brand Name Dispense Instructions for use Diagnosis    cyclobenzaprine 10 MG tablet    FLEXERIL    30 tablet    Take 0.5-1 tablets (5-10 mg) by mouth 3 times daily as needed for muscle spasms    Muscle spasm       diazepam 5 MG tablet    VALIUM    12 tablet    Take 1 tablet (5 mg) by mouth every 6 hours as needed for muscle spasms (MUSCLE SPASM)        HYDROXYZINE HCL PO            IBUPROFEN PO           VITAMIN D (CHOLECALCIFEROL) PO      Take by mouth daily        ZOLOFT PO      Take by mouth daily

## 2017-11-10 ASSESSMENT — ANXIETY QUESTIONNAIRES: GAD7 TOTAL SCORE: 18

## 2017-12-06 ENCOUNTER — TELEPHONE (OUTPATIENT)
Dept: INTERNAL MEDICINE | Facility: CLINIC | Age: 27
End: 2017-12-06

## 2017-12-06 NOTE — LETTER
[facility]  [address], [address]  [place], [place]  [address]                                            TEL:[phone]  FAX:[phone]      Alla Mcclain  [address] [address]      December 6, 2017    Dear Alla     At [facility] we care about your health and well-being.  A review of your chart has indicated that you are due for a Mammogram.  Please contact [facility] at [phone] to schedule an appointment.    If you have already had one or all of the above screening tests at another facility, please call us to update your chart.         Sincerely,      Gracie Carrillo C.N.P.

## 2017-12-06 NOTE — TELEPHONE ENCOUNTER
Panel Management Review      Patient has the following on her problem list:     Depression / Dysthymia review    Measure:  Needs PHQ-9 score of 4 or less during index window.  Administer PHQ-9 and if score is 5 or more, send encounter to provider for next steps.    5 - 7 month window range:     PHQ-9 SCORE 11/9/2017   Total Score 22       If PHQ-9 recheck is 5 or more, route to provider for next steps.    Patient is due for:  None        Composite cancer screening  Chart review shows that this patient is due/due soon for the following Pap Smear  Summary:    Patient is due/failing the following:   PAP    Action needed:   Patient needs office visit for Pap.    Type of outreach:    Sent letter.    Questions for provider review:    None                                                                                                                                    PAZ GRAJEDA CMA       Chart routed to none .

## 2018-04-12 ENCOUNTER — TELEPHONE (OUTPATIENT)
Dept: INTERNAL MEDICINE | Facility: CLINIC | Age: 28
End: 2018-04-12

## 2018-04-12 NOTE — TELEPHONE ENCOUNTER
Called patient to complete Phq9 over the phone- she will call back around 3 pm to complete as she didn't have time. Please schedule appointment if needed. Last Phq9 score 22.  PAZ GRAJEDA CMA

## 2018-04-12 NOTE — LETTER
[facility]  [address], [address]  [place], [place]  [address]                                            TEL:[phone]  FAX:[phone]      Alla Mcclain  [address] [address]      May 10, 2018    Dear Alla      At [facility] we care about your health and well-being.  A review of your chart has indicated that you are due for a Phq9 (depression questionnaire).  Please contact us at [phone] to complete form over the phone and  schedule an appointment if needed.    If you have already had one or all of the above screening tests at another facility, please call us to update your chart.   Sincerely,      Gracie Carrillo C.N.P.

## 2018-06-16 ENCOUNTER — OFFICE VISIT (OUTPATIENT)
Dept: FAMILY MEDICINE | Facility: CLINIC | Age: 28
End: 2018-06-16
Payer: COMMERCIAL

## 2018-06-16 VITALS
BODY MASS INDEX: 19.84 KG/M2 | SYSTOLIC BLOOD PRESSURE: 114 MMHG | WEIGHT: 112 LBS | HEIGHT: 63 IN | TEMPERATURE: 98 F | HEART RATE: 71 BPM | DIASTOLIC BLOOD PRESSURE: 75 MMHG

## 2018-06-16 DIAGNOSIS — Z81.3 FAMILY HISTORY OF DRUG ADDICTION: ICD-10-CM

## 2018-06-16 DIAGNOSIS — F41.9 ANXIETY: ICD-10-CM

## 2018-06-16 DIAGNOSIS — F43.10 PTSD (POST-TRAUMATIC STRESS DISORDER): ICD-10-CM

## 2018-06-16 DIAGNOSIS — F33.1 MODERATE EPISODE OF RECURRENT MAJOR DEPRESSIVE DISORDER (H): Primary | ICD-10-CM

## 2018-06-16 PROCEDURE — 99214 OFFICE O/P EST MOD 30 MIN: CPT | Performed by: FAMILY MEDICINE

## 2018-06-16 RX ORDER — PRAZOSIN HYDROCHLORIDE 1 MG/1
1 CAPSULE ORAL AT BEDTIME
Qty: 30 CAPSULE | Refills: 3 | Status: SHIPPED | OUTPATIENT
Start: 2018-06-16

## 2018-06-16 RX ORDER — SERTRALINE HYDROCHLORIDE 100 MG/1
100 TABLET, FILM COATED ORAL DAILY
Qty: 30 TABLET | Refills: 3 | Status: SHIPPED | OUTPATIENT
Start: 2018-06-16

## 2018-06-16 RX ORDER — LANOLIN ALCOHOL/MO/W.PET/CERES
1 CREAM (GRAM) TOPICAL
COMMUNITY

## 2018-06-16 ASSESSMENT — ANXIETY QUESTIONNAIRES
2. NOT BEING ABLE TO STOP OR CONTROL WORRYING: NEARLY EVERY DAY
1. FEELING NERVOUS, ANXIOUS, OR ON EDGE: NEARLY EVERY DAY
6. BECOMING EASILY ANNOYED OR IRRITABLE: MORE THAN HALF THE DAYS
IF YOU CHECKED OFF ANY PROBLEMS ON THIS QUESTIONNAIRE, HOW DIFFICULT HAVE THESE PROBLEMS MADE IT FOR YOU TO DO YOUR WORK, TAKE CARE OF THINGS AT HOME, OR GET ALONG WITH OTHER PEOPLE: VERY DIFFICULT
GAD7 TOTAL SCORE: 15
5. BEING SO RESTLESS THAT IT IS HARD TO SIT STILL: SEVERAL DAYS
3. WORRYING TOO MUCH ABOUT DIFFERENT THINGS: NEARLY EVERY DAY
7. FEELING AFRAID AS IF SOMETHING AWFUL MIGHT HAPPEN: SEVERAL DAYS

## 2018-06-16 ASSESSMENT — PATIENT HEALTH QUESTIONNAIRE - PHQ9: 5. POOR APPETITE OR OVEREATING: MORE THAN HALF THE DAYS
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
## 2018-06-16 NOTE — NURSING NOTE
"  Chief Complaint   Patient presents with     Medication Request       Initial /75 (BP Location: Right arm, Patient Position: Sitting, Cuff Size: Adult Regular)  Pulse 71  Temp 98  F (36.7  C) (Oral)  Ht 5' 2.75\" (1.594 m)  Wt 112 lb (50.8 kg)  Breastfeeding? No  BMI 20 kg/m2 Estimated body mass index is 20 kg/(m^2) as calculated from the following:    Height as of this encounter: 5' 2.75\" (1.594 m).    Weight as of this encounter: 112 lb (50.8 kg).  Medication Reconciliation: complete      Health Maintenance addressed:  Pap due pt aware will go to [facility] for it.        "

## 2018-06-16 NOTE — MR AVS SNAPSHOT
After Visit Summary   6/16/2018    Alla Mcclain    MRN: [id number]           Patient Information     Date Of Birth          1990        Visit Information        Provider Department      6/16/2018 11:20 AM Shelli Escobar MD [facility]        Today's Diagnoses     Moderate episode of recurrent major depressive disorder (H)    -  1    Anxiety        PTSD (post-traumatic stress disorder)        Family history of drug addiction          Care Instructions    Dr. Islas or Dr. Hubbard          Follow-ups after your visit        Additional Services     MENTAL HEALTH REFERRAL  - Adult; Outpatient Treatment; Individual/Couples/Family/Group Therapy/Health Psychology; [facility]: [facility] [phone]; We will contact you to schedule the appointment or please call with any questions       All scheduling is subject to the client's specific insurance plan & benefits, provider/location availability, and provider clinical specialities.  Please arrive 15 minutes early for your first appointment and bring your completed paperwork.    Please be aware that coverage of these services is subject to the terms and limitations of your health insurance plan.  Call member services at your health plan with any benefit or coverage questions.                      MENTAL HEALTH REFERRAL  - Adult; Psychiatry and Medication Management; Psychiatry; [facility]: [facility] [phone].  Medication management & future refills will be returned to [facility] PCP upon completion of evaluation; We oksana...       All scheduling is subject to the client's specific insurance plan & benefits, provider/location availability, and provider clinical specialities.  Please arrive 15 minutes early for your first appointment and bring your completed paperwork.    Please be aware that coverage of these services is subject to the terms and limitations of your health insurance plan.  Call  "member services at your health plan with any benefit or coverage questions.                            Who to contact     If you have questions or need follow up information about today's clinic visit or your schedule please contact [facility] directly at [phone].  Normal or non-critical lab and imaging results will be communicated to you by MyChart, letter or phone within 4 business days after the clinic has received the results. If you do not hear from us within 7 days, please contact the clinic through [employer] or phone. If you have a critical or abnormal lab result, we will notify you by phone as soon as possible.  Submit refill requests through hiogi or call your pharmacy and they will forward the refill request to us. Please allow 3 business days for your refill to be completed.          Additional Information About Your Visit        [employer] Information     hiogi lets you send messages to your doctor, view your test results, renew your prescriptions, schedule appointments and more. To sign up, go to [email or web address] . Click on \"Log in\" on the left side of the screen, which will take you to the Welcome page. Then click on \"Sign up Now\" on the right side of the page.     You will be asked to enter the access code listed below, as well as some personal information. Please follow the directions to create your username and password.     Your access code is: [email or web address]  Expires: 2018 11:56 AM     Your access code will  in 90 days. If you need help or a new code, please call your [facility] or [phone].        Care EveryWhere ID     This is your Care EveryWhere ID. This could be used by other organizations to access your [place] medical records  [id number]        Your Vitals Were     Pulse Temperature Height Breastfeeding? BMI (Body Mass Index)       71 98  F (36.7  C) (Oral) 5' 2.75\" (1.594 m) No 20 kg/m2        Blood Pressure from Last 3 Encounters: "   06/16/18 114/75   11/09/17 90/60   11/02/17 117/79    Weight from Last 3 Encounters:   06/16/18 112 lb (50.8 kg)   11/09/17 135 lb 1.6 oz (61.3 kg)   11/02/17 134 lb 7.7 oz (61 kg)              We Performed the Following     DEPRESSION ACTION PLAN (DAP)     MENTAL HEALTH REFERRAL  - Adult; Outpatient Treatment; Individual/Couples/Family/Group Therapy/Health Psychology; [facility] [place] [phone]; We will contact you to schedule the appointment or please call with any questions     MENTAL HEALTH REFERRAL  - Adult; Psychiatry and Medication Management; Psychiatry; [facility] [place]: [facility] [phone].  Medication management & future refills will be returned to [facility] PCP upon completion of evaluation; We oksana...          Today's Medication Changes          These changes are accurate as of 6/16/18 11:56 AM.  If you have any questions, ask your nurse or doctor.               Start taking these medicines.        Dose/Directions    prazosin 1 MG capsule   Commonly known as:  MINIPRESS   Used for:  Moderate episode of recurrent major depressive disorder (H)   Started by:  Shelli Esocbar MD        Dose:  1 mg   Take 1 capsule (1 mg) by mouth At Bedtime   Quantity:  30 capsule   Refills:  3         These medicines have changed or have updated prescriptions.        Dose/Directions    sertraline 100 MG tablet   Commonly known as:  ZOLOFT   This may have changed:    - medication strength  - how much to take   Used for:  Moderate episode of recurrent major depressive disorder (H)   Changed by:  Shelli Escobar MD        Dose:  100 mg   Take 1 tablet (100 mg) by mouth daily   Quantity:  30 tablet   Refills:  3         Stop taking these medicines if you haven't already. Please contact your care team if you have questions.     cyclobenzaprine 10 MG tablet   Commonly known as:  FLEXERIL   Stopped by:  Shelli Escobar MD           diazepam 5 MG tablet   Commonly known as:   VALIUM   Stopped by:  Shelli Escobar MD                Where to get your medicines      These medications were sent to [facility] [address] [facility]  [address] [place] [address]     Phone:  [phone]     prazosin 1 MG capsule    sertraline 100 MG tablet                Primary Care Provider Office Phone # Fax #    [facility] [phone] [address] [facility] [address]        Equal Access to Services     [facility]LIE : Hadii aad ku hadasho Soomaali, waaxda luqadaha, qaybta kaalmada adeegyada, waxay idiin hayaan adeeg alfredo kahn . So [facility] [phone].    ATENCIÓN: Si habla español, tiene a gee disposición servicios gratuitos de asistencia lingüística. [facility] [phone].    We comply with applicable federal civil rights laws and [place] laws. We do not discriminate on the basis of race, color, national origin, age, disability, sex, sexual orientation, or gender identity.            Thank you!     Thank you for choosing [facility]  for your care. Our goal is always to provide you with excellent care. Hearing back from our patients is one way we can continue to improve our services. Please take a few minutes to complete the written survey that you may receive in the mail after your visit with us. Thank you!             Your Updated Medication List - Protect others around you: Learn how to safely use, store and throw away your medicines at www.disposemymeds.org.          This list is accurate as of 6/16/18 11:56 AM.  Always use your most recent med list.                   Brand Name Dispense Instructions for use Diagnosis    HYDROXYZINE HCL PO           IBUPROFEN PO           melatonin 3 MG tablet      Take 1 mg by mouth nightly as needed for sleep    Moderate episode of recurrent major depressive disorder (H)       prazosin 1 MG capsule    MINIPRESS    30 capsule    Take 1 capsule (1 mg)  by mouth At Bedtime    Moderate episode of recurrent major depressive disorder (H)       sertraline 100 MG tablet    ZOLOFT    30 tablet    Take 1 tablet (100 mg) by mouth daily    Moderate episode of recurrent major depressive disorder (H)       VITAMIN D (CHOLECALCIFEROL) PO      Take by mouth daily

## 2018-06-17 ASSESSMENT — PATIENT HEALTH QUESTIONNAIRE - PHQ9: SUM OF ALL RESPONSES TO PHQ QUESTIONS 1-9: 22

## 2018-06-17 ASSESSMENT — ANXIETY QUESTIONNAIRES: GAD7 TOTAL SCORE: 15

## 2018-09-10 ENCOUNTER — TELEPHONE (OUTPATIENT)
Dept: FAMILY MEDICINE | Facility: CLINIC | Age: 28
End: 2018-09-10

## 2018-09-10 NOTE — LETTER
September 25, 2018      Alla Mcclain  [address] [facility]   [place] [address]        Dear Alla,     Our records indicate that you may be due for preventative health care services.  Please make an appointment or call to set up the following tests as recommended.  If you have already had this testing done at another clinic please contact us to help us update our records to reflect the preventative care that you have had.    Cervical cancer screen (pap smear) - Recommended at least every 3 years for women 21 and older.  A Pap test is used to detect cervical cancer.  The test should be taken at least once every three years but women who are at a greater risk for cervical cancer may need to have the test more often.  You may be at a greater risk for cervical cancer if: (1) You have had a sexually transmitted disease, (2) You have had more than one sex partner, (3) You have had an abnormal pap test in the past.                                                                                                                                                Thank you for choosing [facility]. We appreciate the opportunity to serve you and look forward to supporting your healthcare needs in the future.    If you have any questions or concerns, please contact us at [phone]          Sincerely,      [facility] Staff

## 2018-09-10 NOTE — TELEPHONE ENCOUNTER
Panel Management Review      Patient has the following on her problem list:     Depression / Dysthymia review    Measure:  Needs PHQ-9 score of 4 or less during index window.  Administer PHQ-9 and if score is 5 or more, send encounter to provider for next steps.    5 - 7 month window range:     PHQ-9 SCORE 11/9/2017 6/16/2018   Total Score 22 22       If PHQ-9 recheck is 5 or more, route to provider for next steps.    Patient is due for:  None      Composite cancer screening  Chart review shows that this patient is due/due soon for the following Pap Smear  Summary:    Patient is due/failing the following:   PAP    Action needed:   Patient needs office visit for pap.    Type of outreach:    Phone, left message for patient to call back.     Questions for provider review:    None                                                                                                                                    MARTHA Diaz       Chart routed to  .

## 2019-04-02 ENCOUNTER — TELEPHONE (OUTPATIENT)
Dept: FAMILY MEDICINE | Facility: CLINIC | Age: 29
End: 2019-04-02

## 2019-04-02 NOTE — TELEPHONE ENCOUNTER
Panel Management Review      Patient has the following on her problem list:     Depression / Dysthymia review    Measure:  Needs PHQ-9 score of 4 or less during index window.  Administer PHQ-9 and if score is 5 or more, send encounter to provider for next steps.    5 - 7 month window range: no    PHQ-9 SCORE 11/9/2017 6/16/2018   PHQ-9 Total Score 22 22       If PHQ-9 recheck is 5 or more, route to provider for next steps.    Patient is due for:  PHQ9      Composite cancer screening  Chart review shows that this patient is due/due soon for the following Pap Smear  Summary:    Patient is due/failing the following:   PHYSICAL    Action needed:   Patient needs office visit for physical with pap.    Type of outreach:    Phone, left message for patient to call back.     Questions for provider review:    None                                                                                                                                    Jose Zuleta [facility]           Chart routed to Care Team .

## 2019-04-02 NOTE — LETTER
Alla Mcclain  [address] [place] [id number]      Dear Alla,    Our records indicate that you may be due for preventative health care services.  Please make an appointment or call to set up the following tests as recommended.  If you have already had this testing done at another clinic please contact us to help us update our records to reflect the preventative care that you have had.    Cervical cancer screen (pap smear) - Recommended at least every 3 years for women 21 and older.  A Pap test is used to detect cervical cancer.  The test should be taken at least once every three years but women who are at a greater risk for cervical cancer may need to have the test more often.  You may be at a greater risk for cervical cancer if: (1) You have had a sexually transmitted disease, (2) You have had more than one sex partner, (3) You have had an abnormal pap test in the past.                                                                                                                                                Thank you for choosing [facility]. We appreciate the opportunity to serve you and look forward to supporting your healthcare needs in the future.    If you have any questions or concerns, please contact us at [phone]      Sincerely,       Shelli Escobar MD/[facility]

## 2020-02-20 NOTE — LETTER
My Depression Action Plan  Name: Alla Mcclain   Date of Birth 1990  Date: 6/16/2018    [facility]   [facility] [address] [address] [phone]          GREEN    ZONE   Good Control    What it looks like:     Things are going generally well. You have normal up s and down s. You may even feel depressed from time to time, but bad moods usually last less than a day.   What you need to do:  1. Continue to care for yourself (see self care plan)  2. Check your depression survival kit and update it as needed  3. Follow your physician s recommendations including any medication.  4. Do not stop taking medication unless you consult with your physician first.           YELLOW         ZONE Getting Worse    What it looks like:     Depression is starting to interfere with your life.     It may be hard to get out of bed; you may be starting to isolate yourself from others.    Symptoms of depression are starting to last most all day and this has happened for several days.     You may have suicidal thoughts but they are not constant.   What you need to do:     1. Call your care team, your response to treatment will improve if you keep your care team informed of your progress. Yellow periods are signs an adjustment may need to be made.     2. Continue your self-care, even if you have to fake it!    3. Talk to someone in your support network    4. Open up your depression survival kit           RED    ZONE Medical Alert - Get Help    What it looks like:     Depression is seriously interfering with your life.     You may experience these or other symptoms: You can t get out of bed most days, can t work or engage in other necessary activities, you have trouble taking care of basic hygiene, or basic responsibilities, thoughts of suicide or death that will not go away, self-injurious behavior.     What you need to  Destruction of retinopathy, laser (PRP): OS do:  1. Call your care team and request a same-day appointment. If they are not available (weekends or after hours) call your local crisis line, emergency room or 911.            Depression Self Care Plan / Survival Kit    Self-Care for Depression  Here s the deal. Your body and mind are really not as separate as most people think.  What you do and think affects how you feel and how you feel influences what you do and think. This means if you do things that people who feel good do, it will help you feel better.  Sometimes this is all it takes.  There is also a place for medication and therapy depending on how severe your depression is, so be sure to consult with your medical provider and/ or Behavioral Health Consultant if your symptoms are worsening or not improving.     In order to better manage my stress, I will:    Exercise  Get some form of exercise, every day. This will help reduce pain and release endorphins, the  feel good  chemicals in your brain. This is almost as good as taking antidepressants!  This is not the same as joining a gym and then never going! (they count on that by the way ) It can be as simple as just going for a walk or doing some gardening, anything that will get you moving.      Hygiene   Maintain good hygiene (Get out of bed in the morning, Make your bed, Brush your teeth, Take a shower, and Get dressed like you were going to work, even if you are unemployed).  If your clothes don't fit try to get ones that do.    Diet  I will strive to eat foods that are good for me, drink plenty of water, and avoid excessive sugar, caffeine, alcohol, and other mood-altering substances.  Some foods that are helpful in depression are: complex carbohydrates, B vitamins, flaxseed, fish or fish oil, fresh fruits and vegetables.    Psychotherapy  I agree to participate in Individual Therapy (if recommended).    Medication  If prescribed medications, I agree to take them.  Missing doses can result in serious  side effects.  I understand that drinking alcohol, or other illicit drug use, may cause potential side effects.  I will not stop my medication abruptly without first discussing it with my provider.    Staying Connected With Others  I will stay in touch with my friends, family members, and my primary care provider/team.    Use your imagination  Be creative.  We all have a creative side; it doesn t matter if it s oil painting, sand castles, or mud pies! This will also kick up the endorphins.    Witness Beauty  (AKA stop and smell the roses) Take a look outside, even in mid-winter. Notice colors, textures. Watch the squirrels and birds.     Service to others  Be of service to others.  There is always someone else in need.  By helping others we can  get out of ourselves  and remember the really important things.  This also provides opportunities for practicing all the other parts of the program.    Humor  Laugh and be silly!  Adjust your TV habits for less news and crime-drama and more comedy.    Control your stress  Try breathing deep, massage therapy, biofeedback, and meditation. Find time to relax each day.     My support system    Clinic Contact:  Phone number:    Contact 1:  Phone number:    Contact 2:  Phone number:    [employer]/:  Phone number:    Therapist:  Phone number:    Local crisis center:    Phone number:    Other community support:  Phone number:

## 2021-10-24 ENCOUNTER — OFFICE VISIT (OUTPATIENT)
Dept: URGENT CARE | Age: 31
End: 2021-10-24

## 2021-10-24 VITALS
OXYGEN SATURATION: 100 % | RESPIRATION RATE: 16 BRPM | TEMPERATURE: 96.5 F | SYSTOLIC BLOOD PRESSURE: 112 MMHG | HEART RATE: 95 BPM | DIASTOLIC BLOOD PRESSURE: 70 MMHG

## 2021-10-24 DIAGNOSIS — R10.9 ABDOMINAL PAIN, UNSPECIFIED ABDOMINAL LOCATION: Primary | ICD-10-CM

## 2021-10-24 DIAGNOSIS — E86.0 DEHYDRATION: ICD-10-CM

## 2021-10-24 DIAGNOSIS — R11.2 NAUSEA AND VOMITING, INTRACTABILITY OF VOMITING NOT SPECIFIED, UNSPECIFIED VOMITING TYPE: ICD-10-CM

## 2021-10-24 PROCEDURE — 96361 HYDRATE IV INFUSION ADD-ON: CPT | Performed by: FAMILY MEDICINE

## 2021-10-24 PROCEDURE — 96372 THER/PROPH/DIAG INJ SC/IM: CPT | Performed by: FAMILY MEDICINE

## 2021-10-24 PROCEDURE — 96374 THER/PROPH/DIAG INJ IV PUSH: CPT | Performed by: FAMILY MEDICINE

## 2021-10-24 PROCEDURE — 99205 OFFICE O/P NEW HI 60 MIN: CPT | Performed by: FAMILY MEDICINE

## 2021-10-24 PROCEDURE — 96375 TX/PRO/DX INJ NEW DRUG ADDON: CPT | Performed by: FAMILY MEDICINE

## 2021-10-24 RX ORDER — KETOROLAC TROMETHAMINE 30 MG/ML
30 INJECTION, SOLUTION INTRAMUSCULAR; INTRAVENOUS ONCE
Status: COMPLETED | OUTPATIENT
Start: 2021-10-24 | End: 2021-10-24

## 2021-10-24 RX ORDER — ONDANSETRON 2 MG/ML
4 INJECTION INTRAMUSCULAR; INTRAVENOUS ONCE
Status: COMPLETED | OUTPATIENT
Start: 2021-10-24 | End: 2021-10-24

## 2021-10-24 RX ORDER — ONDANSETRON 8 MG/1
8 TABLET, ORALLY DISINTEGRATING ORAL EVERY 8 HOURS PRN
Qty: 10 TABLET | Refills: 0 | Status: SHIPPED | OUTPATIENT
Start: 2021-10-24 | End: 2021-10-29

## 2021-10-24 RX ADMIN — KETOROLAC TROMETHAMINE 30 MG: 30 INJECTION, SOLUTION INTRAMUSCULAR; INTRAVENOUS at 13:34

## 2021-10-24 RX ADMIN — ONDANSETRON 4 MG: 2 INJECTION INTRAMUSCULAR; INTRAVENOUS at 13:27
